# Patient Record
Sex: FEMALE | Race: WHITE | URBAN - METROPOLITAN AREA
[De-identification: names, ages, dates, MRNs, and addresses within clinical notes are randomized per-mention and may not be internally consistent; named-entity substitution may affect disease eponyms.]

---

## 2018-07-18 ENCOUNTER — TELEPHONE (OUTPATIENT)
Dept: CARDIOLOGY | Facility: CLINIC | Age: 43
End: 2018-07-18

## 2018-07-23 DIAGNOSIS — Q21.0 VENTRICULAR SEPTAL DEFECT: Primary | ICD-10-CM

## 2018-07-23 DIAGNOSIS — Q25.0 PATENT DUCTUS ARTERIOSUS: ICD-10-CM

## 2018-07-30 ENCOUNTER — TELEPHONE (OUTPATIENT)
Dept: CARDIOLOGY | Facility: CLINIC | Age: 43
End: 2018-07-30

## 2018-08-06 ENCOUNTER — HOSPITAL ENCOUNTER (EMERGENCY)
Facility: CLINIC | Age: 43
Discharge: HOME | End: 2018-08-06
Attending: EMERGENCY MEDICINE | Admitting: EMERGENCY MEDICINE
Payer: COMMERCIAL

## 2018-08-06 VITALS
HEIGHT: 66 IN | RESPIRATION RATE: 16 BRPM | OXYGEN SATURATION: 99 % | BODY MASS INDEX: 29.05 KG/M2 | HEART RATE: 50 BPM | WEIGHT: 180.78 LBS | TEMPERATURE: 97.8 F | SYSTOLIC BLOOD PRESSURE: 126 MMHG | DIASTOLIC BLOOD PRESSURE: 78 MMHG

## 2018-08-06 DIAGNOSIS — R21 FACIAL RASH: ICD-10-CM

## 2018-08-06 PROCEDURE — 25000132 ZZH RX MED GY IP 250 OP 250 PS 637: Performed by: EMERGENCY MEDICINE

## 2018-08-06 PROCEDURE — 25000125 ZZHC RX 250: Performed by: EMERGENCY MEDICINE

## 2018-08-06 PROCEDURE — 99283 EMERGENCY DEPT VISIT LOW MDM: CPT

## 2018-08-06 RX ORDER — PREDNISONE 20 MG/1
40 TABLET ORAL DAILY
Qty: 6 TABLET | Refills: 0 | Status: SHIPPED | OUTPATIENT
Start: 2018-08-07 | End: 2018-08-10

## 2018-08-06 RX ORDER — DIPHENHYDRAMINE HCL 25 MG
25 CAPSULE ORAL ONCE
Status: COMPLETED | OUTPATIENT
Start: 2018-08-06 | End: 2018-08-06

## 2018-08-06 RX ORDER — DIPHENHYDRAMINE HCL 25 MG
25 TABLET ORAL EVERY 6 HOURS PRN
Qty: 20 TABLET | Refills: 0 | Status: SHIPPED | OUTPATIENT
Start: 2018-08-06

## 2018-08-06 RX ORDER — PREDNISONE 20 MG/1
40 TABLET ORAL ONCE
Status: COMPLETED | OUTPATIENT
Start: 2018-08-06 | End: 2018-08-06

## 2018-08-06 RX ADMIN — DIPHENHYDRAMINE HYDROCHLORIDE 25 MG: 25 CAPSULE ORAL at 06:33

## 2018-08-06 RX ADMIN — PREDNISONE 40 MG: 20 TABLET ORAL at 06:33

## 2018-08-06 ASSESSMENT — ENCOUNTER SYMPTOMS: EYE ITCHING: 1

## 2018-08-06 NOTE — ED TRIAGE NOTES
Pt with facial swelling around eyes with associated rash due to unknown allergen.  Denies difficulty breathing.  First felt 'itchy' on Friday, then woke up this AM with extensive rash around both eyes and swelling.

## 2018-08-06 NOTE — ED AVS SNAPSHOT
[facility] Emergency Department    [address] [place] [address]    Phone:  [phone]    Fax:  [phone]                                       Keri Solares   MRN: [id number]    Department:  [facility] Emergency [facility]   Date of Visit:  8/6/2018           Patient Information     Date Of Birth          1975        Your diagnoses for this visit were:     Facial rash        You were seen by Miguel Stone MD and Brent Barbosa MD.      Follow-up Information     Follow up with Vianca Rodriguez MD In 2 days.    Specialty:  OB/Gyn    Contact information:    OB [facility] [address] [place] [id number] [phone]          Discharge Instructions       Cool compresses to face,    Avoid scratching.    Return if fever, increased swelling, pain, eye discomfort.    24 Hour Appointment Hotline       To make an appointment at any [facility] clinic, call [phone] ([phone]). If you don't have a family doctor or clinic, we will help you find one. [facility] clinics are conveniently located to serve the needs of you and your family.             Review of your medicines      START taking        Dose / Directions Last dose taken    diphenhydrAMINE 25 MG tablet   Commonly known as:  BENADRYL   Dose:  25 mg   Quantity:  20 tablet        Take 1 tablet (25 mg) by mouth every 6 hours as needed for itching   Refills:  0        predniSONE 20 MG tablet   Commonly known as:  DELTASONE   Dose:  40 mg   Quantity:  6 tablet   Start taking on:  8/7/2018        Take 2 tablets (40 mg) by mouth daily for 3 days   Refills:  0          Our records show that you are taking the medicines listed below. If these are incorrect, please call your family doctor or clinic.        Dose / Directions Last dose taken    ALEVE 220 MG capsule   Dose:  220 mg   Generic drug:  naproxen sodium        Take 220 mg by mouth daily as needed   Refills:  0        cetirizine 10 MG tablet    Commonly known as:  zyrTEC   Dose:  10 mg        Take 10 mg by mouth daily as needed   Refills:  0        LORazepam 1 MG tablet   Commonly known as:  ATIVAN   Quantity:  2 tablet        Patient may take 1-2 mg 60 minutes prior to MRI. Will need .   Refills:  0        multivitamin, therapeutic with minerals Tabs tablet   Dose:  1 tablet        Take 1 tablet by mouth daily   Refills:  0                Prescriptions were sent or printed at these locations (2 Prescriptions)                   Other Prescriptions                Printed at Department/Unit printer (2 of 2)         diphenhydrAMINE (BENADRYL) 25 MG tablet               predniSONE (DELTASONE) 20 MG tablet                Orders Needing Specimen Collection     None      Pending Results     No orders found from 8/4/2018 to 8/7/2018.            Pending Culture Results     No orders found from 8/4/2018 to 8/7/2018.            Pending Results Instructions     If you had any lab results that were not finalized at the time of your Discharge, you can call the ED Lab Result RN at [phone]. You will be contacted by this team for any positive Lab results or changes in treatment. The nurses are available 7 days a week from 10A to 6:30P.  You can leave a message 24 hours per day and they will return your call.        Test Results From Your Hospital Stay               Clinical Quality Measure: Blood Pressure Screening     Your blood pressure was checked while you were in the emergency department today. The last reading we obtained was  BP: 128/88 . Please read the guidelines below about what these numbers mean and what you should do about them.  If your systolic blood pressure (the top number) is less than 120 and your diastolic blood pressure (the bottom number) is less than 80, then your blood pressure is normal. There is nothing more that you need to do about it.  If your systolic blood pressure (the top number) is 120-139 or your diastolic blood pressure (the  "bottom number) is 80-89, your blood pressure may be higher than it should be. You should have your blood pressure rechecked within a year by a primary care provider.  If your systolic blood pressure (the top number) is 140 or greater or your diastolic blood pressure (the bottom number) is 90 or greater, you may have high blood pressure. High blood pressure is treatable, but if left untreated over time it can put you at risk for heart attack, stroke, or kidney failure. You should have your blood pressure rechecked by a primary care provider within the next 4 weeks.  If your provider in the emergency department today gave you specific instructions to follow-up with your doctor or provider even sooner than that, you should follow that instruction and not wait for up to 4 weeks for your follow-up visit.        Thank you for choosing [facility]       Thank you for choosing [facility] for your care. Our goal is always to provide you with excellent care. Hearing back from our patients is one way we can continue to improve our services. Please take a few minutes to complete the written survey that you may receive in the mail after you visit with us. Thank you!        [employer] Information     [employer] lets you send messages to your doctor, view your test results, renew your prescriptions, schedule appointments and more. To sign up, go to [email or web address] . Click on \"Log in\" on the left side of the screen, which will take you to the Welcome page. Then click on \"Sign up Now\" on the right side of the page.     You will be asked to enter the access code listed below, as well as some personal information. Please follow the directions to create your username and password.     Your access code is: [email or web address]  Expires: 2018  6:59 AM     Your access code will  in 90 days. If you need help or a new code, please call your [facility] clinic or [phone].        Care EveryWhere ID     This is your Care EveryWhere ID. This " could be used by other organizations to access your [facility] medical records  [id number]        Equal Access to Services     TIFFANIE MEDINA : Hadii bernard Joseph, melony velasco, ofelia krishnan. So [facility] [phone].    ATENCIÓN: Si habla español, tiene a massey disposición servicios gratuitos de asistencia lingüística. Llame al [phone].    We comply with applicable federal civil rights laws and [place] laws. We do not discriminate on the basis of race, color, national origin, age, disability, sex, sexual orientation, or gender identity.            After Visit Summary       This is your record. Keep this with you and show to your community pharmacist(s) and doctor(s) at your next visit.

## 2018-08-06 NOTE — DISCHARGE INSTRUCTIONS
Cool compresses to face,    Avoid scratching.    Return if fever, increased swelling, pain, eye discomfort.

## 2018-08-06 NOTE — ED PROVIDER NOTES
"  History     Chief Complaint:  Facial Swelling     HPI   Keri Solares is a 43 year old female who presents to the emergency department today for evaluation of facial swelling. 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 The patient says she then started to notice a little swelling on Sunday on both cheeks and around her eyes and then  woke up this morning with even more swelling, redness, and itchiness. The patient denies using any new product on her face or new food and the swelling is nowhere else on her body. The patient notes that she is currently being worked up by an allergist for some outside allergies but does not know what caused this. The patient says she has only taken her Zyrtec.     Allergies:  No Known Drug Allergies      Medications:    Ativan  Zyrtec     Past Medical History:    ASD (atrial septal defect)    Past Surgical History:    Bicuspid valve and ASD/PFO repair     Family History:    Mother: Breast cancer  Father: Aneurysm, cerebrovascular disease    Social History:  Smoking Status: Former Smoker   Packs/day: 1.00   Years: 5.00   Quit date: 5/1/2004  Smokeless Tobacco: Never Used  Alcohol Use: Positive   2 drinks per week    Marital Status:       Review of Systems   Eyes: Positive for itching.   Skin:        Swelling, itchiness, and redness on both cheeks and around eyes    All other systems reviewed and are negative.    Physical Exam     Patient Vitals for the past 24 hrs:   BP Temp Temp src Pulse Heart Rate Resp SpO2 Height Weight   08/06/18 0719 126/78 - - 50 50 16 99 % - -   08/06/18 0536 128/88 97.8  F (36.6  C) Temporal (!) 48 - 18 100 % 1.676 m (5' 6\") 82 kg (180 lb 12.4 oz)     Physical Exam  Vital signs and nursing notes reviewed.     Constitutional: laying on gurney appears comfortable  HENT: No evidence of facial or head injury.    Eyes: Conjunctivae " are normal bilaterally. Pupils equal  Neck: normal range of motion  Cardiovascular: bradycardic rate.    Pulmonary/Chest: No respiratory distress.   Musculoskeletal: no joint swelling noted  Neurological: Alert and oriented. No focal weakness  Skin: Skin is warm and dry. Erythematous rash of bother upper cheeks and eyelid area, no honey comb crusting or vesicles/pustules.  No significant tendnerness.   Psych: normal affect     Emergency Department Course     Interventions:  0633 Benadryl 25 mg Oral  0633 Prednisone 40 mg Oral     Emergency Department Course:    0607 Nursing notes and vitals reviewed.    0610 I performed an exam of the patient as documented above.     0720 I personally answered all related questions prior to discharge.    Impression & Plan      Medical Decision Making:  Keri Solares is a 43 year old female who presents to the emergency department today for evaluation of an itchy, puffy, red rash around both eyelids and upper cheeks. There is no clear indication of anything she put on her face that may have caused a localized inflammatory reaction. There does not appear to be consistent impetigo or lupus type rash. This seems to be mostly inflammatory versus allergic. There is no definable infectious etiology therefore I do not feel antibiotics are warranted. I recommended benadryl and prednisone therapy and close monitoring. Patient understands reasons to return and was discharged home.     Diagnosis:    ICD-10-CM    1. Facial rash R21      Disposition:   The patient is discharged to home.    Discharge Medications:  Discharge Medication List as of 8/6/2018  7:13 AM      START taking these medications    Details   diphenhydrAMINE (BENADRYL) 25 MG tablet Take 1 tablet (25 mg) by mouth every 6 hours as needed for itching, Disp-20 tablet, R-0, Local Print      predniSONE (DELTASONE) 20 MG tablet Take 2 tablets (40 mg) by mouth daily for 3 days, Disp-6 tablet, R-0, Local Print           Scribe  Disclosure:  I, Colette Paige, am serving as a scribe at 6:09 AM on 8/6/2018 to document services personally performed by Brent Barbosa MD based on my observations and the provider's statements to me.      [facility] EMERGENCY DEPARTMENT       Brent Barbosa MD  08/10/18 6252

## 2018-08-06 NOTE — ED AVS SNAPSHOT
[facility] Emergency Department    [address]    [facility] [address]    Phone:  [phone]    Fax:  [phone]                                       Keri Solares   MRN: [id number]    Department:  [facility] Emergency Department   Date of Visit:  8/6/2018           After Visit Summary Signature Page     I have received my discharge instructions, and my questions have been answered. I have discussed any challenges I see with this plan with the nurse or doctor.    ..........................................................................................................................................  Patient/Patient Representative Signature      ..........................................................................................................................................  Patient Representative Print Name and Relationship to Patient    ..................................................               ................................................  Date                                            Time    ..........................................................................................................................................  Reviewed by Signature/Title    ...................................................              ..............................................  Date                                                            Time

## 2018-08-06 NOTE — ED NOTES
"While assessment/questions being asked, swelling has diminished. Left eyelid has opened more than the \"slant\" it was.   "

## 2018-08-08 ENCOUNTER — TELEPHONE (OUTPATIENT)
Dept: CARDIOLOGY | Facility: CLINIC | Age: 43
End: 2018-08-08

## 2018-08-15 ENCOUNTER — TELEPHONE (OUTPATIENT)
Dept: CARDIOLOGY | Facility: CLINIC | Age: 43
End: 2018-08-15

## 2018-08-21 ENCOUNTER — TELEPHONE (OUTPATIENT)
Dept: CARDIOLOGY | Facility: CLINIC | Age: 43
End: 2018-08-21